# Patient Record
Sex: MALE | Race: OTHER | HISPANIC OR LATINO
[De-identification: names, ages, dates, MRNs, and addresses within clinical notes are randomized per-mention and may not be internally consistent; named-entity substitution may affect disease eponyms.]

---
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
## 2022-08-01 ENCOUNTER — EMERGENCY (EMERGENCY)
Facility: HOSPITAL | Age: 22
LOS: 1 days | Discharge: HOME | End: 2022-08-01
Attending: STUDENT IN AN ORGANIZED HEALTH CARE EDUCATION/TRAINING PROGRAM
Payer: MEDICAID

## 2022-08-01 VITALS
OXYGEN SATURATION: 98 % | SYSTOLIC BLOOD PRESSURE: 111 MMHG | RESPIRATION RATE: 20 BRPM | WEIGHT: 179.9 LBS | DIASTOLIC BLOOD PRESSURE: 63 MMHG | TEMPERATURE: 98 F | HEIGHT: 68 IN | HEART RATE: 80 BPM

## 2022-08-01 PROCEDURE — 99283 EMERGENCY DEPT VISIT LOW MDM: CPT

## 2022-08-01 RX ORDER — METHOCARBAMOL 500 MG/1
1 TABLET, FILM COATED ORAL
Qty: 9 | Refills: 0
Start: 2022-08-01 | End: 2022-08-03

## 2022-08-01 RX ORDER — LIDOCAINE 4 G/100G
1 CREAM TOPICAL ONCE
Refills: 0 | Status: COMPLETED | OUTPATIENT
Start: 2022-08-01 | End: 2022-08-01

## 2022-08-01 RX ORDER — IBUPROFEN 200 MG
600 TABLET ORAL ONCE
Refills: 0 | Status: COMPLETED | OUTPATIENT
Start: 2022-08-01 | End: 2022-08-01

## 2022-08-01 RX ORDER — ACETAMINOPHEN 500 MG
650 TABLET ORAL ONCE
Refills: 0 | Status: COMPLETED | OUTPATIENT
Start: 2022-08-01 | End: 2022-08-01

## 2022-08-01 RX ADMIN — Medication 600 MILLIGRAM(S): at 18:13

## 2022-08-01 RX ADMIN — Medication 650 MILLIGRAM(S): at 18:13

## 2022-08-01 RX ADMIN — LIDOCAINE 1 PATCH: 4 CREAM TOPICAL at 18:13

## 2022-08-01 NOTE — ED PROVIDER NOTE - CLINICAL SUMMARY MEDICAL DECISION MAKING FREE TEXT BOX
22 y/o M with no PMHx complains of right upper back pain x 1 day. PE remarkable for right trapezius tenderness and right thoracic paraspinal tenderness to palpation. No bony midline tenderness, neurologically intact.   Pain control with tylenol, ibuprofen, lidocaine patch. Will reassess.

## 2022-08-01 NOTE — ED PROVIDER NOTE - MUSCULOSKELETAL [+], MLM
BACK PAIN Coordinated care with our medical team and with Jayla Maddox. Pt wants inpatient psychiatric admission and still reporting suicidality. Discussed pt's goals for inpatient treatment, and his expectations. Discussed his substance use and he said he was not using at the moment., Coordinated care with our medical team and with Jayla Maddox. Pt wants inpatient psychiatric admission and still reporting suicidality. Discussed pt's goals for inpatient treatment, and his expectations. Discussed his substance use and he said he was not using at the moment.,
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
## 2022-08-01 NOTE — ED PROVIDER NOTE - NSFOLLOWUPCLINICS_GEN_ALL_ED_FT
[facility]  Ortho/Spine  [address] [place] [address]  Phone: [phone]  Fax:   Follow Up Time: 4-6 Days    [address] [address]  Phone: [phone]  Fax:   Follow Up Time: 1-3 Days
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
## 2022-08-01 NOTE — ED PROVIDER NOTE - NS ED ATTENDING STATEMENT MOD
I have seen and examined this patient and fully participated in the care of this patient as the teaching attending.  The service was shared with the MELLISA.  I reviewed and verified the documentation and independently performed the documented:
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
## 2022-08-01 NOTE — ED PROVIDER NOTE - PHYSICAL EXAMINATION
Vital signs noted, see flowsheet.  General: NAD, well appearing and non-toxic.  HEENT: NC/AT. MMM. Conjunctiva and sclera clear b/l.  EOMI. PERRL.  Neck: Soft and supple, full ROM without pain. No C-spine ttp   Cardiac: RRR. +S1/S2. Peripheral pulses 2+ and symmetric b/l.   Respiratory: Speaking in full sentences, no evidence of respiratory distress. Lungs CTA b/l, no wheezes/rhonchi/rales/stridor.   Abdomen: BSx4. Soft, NTND. No guarding or rebound tenderness. No suprapubic tenderness.  Back: R trapezius ttp, R paraspinal thoracic ttp, No CVAT.  Skin: Normal color for race, no evidence of rash, ecchymosis, cyanosis or jaundice.   Neuro: Awake, alert and oriented to person/place/time/situation. Moves all extremities spontaneously and symmetrically.  No focal deficit,  strong and equal, sensation intact   Psych: Normal affect

## 2022-08-01 NOTE — ED PROVIDER NOTE - NSFOLLOWUPINSTRUCTIONS_ED_ALL_ED_FT
- Deepika un seguimiento con valdez médico de atención primaria en 1 o 2 días. Si no puede hacer un seguimiento con valdez médico de atención primaria, regrese al servicio de urgencias por cualquier problema urgente.  - Busque atención médica inmediata ante cualquier signo o síntoma nuevo, que empeore o que le preocupe.  - [place] los medicamentos según las indicaciones, asegúrese de leer todas las instrucciones en el empaque  - Si tiene dificultades para realizar un seguimiento, llame al: [phone] ([id number]) o visite [email or web address] para obtener un médico o especialista de [facility] que acepte valdez seguro en valdez área.    ¡Sentirse mejor!       Dolor musculoesquelético    Musculoskeletal Pain      "Dolor musculoesquelético" hace referencia a los santo y las molestias en los huesos, las articulaciones, los músculos y los tejidos que los rodean. Tammie dolor puede ocurrir en cualquier parte del cuerpo. Puede durar un breve período (andre) o prolongarse mucho tiempo (crónico).    Es posible que se realicen un examen físico, análisis de laboratorio y estudios de diagnóstico por imágenes para encontrar la causa del dolor musculoesquelético.      Siga estas instrucciones en valdez casa:    Estilo de juan   •Trate de controlar o reducir los niveles de estrés. El estrés aumenta la tensión muscular y puede empeorar el dolor musculoesquelético. Es importante reconocer cuando está ansioso o estresado y aprender distintas formas de controlar tammie estado. D'Lo puede incluir:  •Yoga o meditación.      •Terapia cognitiva o conductual.      •Acupuntura o terapia de masajes.        •Podrá seguir con todas las actividades, a menos que estas le generen más dolor. Cuando el dolor disminuya, retome las actividades habituales de a poco. Aumente gradualmente la intensidad y la duración de las actividades o del ejercicio que realice.        Control del dolor, la rigidez y la hinchazón                   •El tratamiento puede incluir medicamentos para el dolor y la inflamación que se grisel por boca o que se aplican sobre la piel. Use los medicamentos de venta vikram y los recetados solamente ramon se lo haya indicado el médico.      •Si el dolor es intenso, el reposo en cama puede ser beneficioso. Acuéstese o siéntese en cualquier posición que sea cómoda, jacqueline salga de la cama y camine al menos cada dos horas.    •Si se lo indican, aplique calor en la kiet afectada con la frecuencia que le haya indicado el médico. Use la sandeep de calor que el médico le recomiende, ramno candelaria compresa de calor húmedo o candelaria almohadilla térmica.  •Coloque candelaria toalla entre la piel y la sandeep de calor.      •Aplique calor kel 20 a 30 minutos.      •Retire la sandeep de calor si la piel se pone de color moore brillante. D'Lo es especialmente importante si no puede sentir dolor, calor o frío. Puede correr un riesgo mayor de sufrir quemaduras.      •Si se lo indican, aplique hielo sobre la kiet dolorida. Para hacer esto:  •Ponga el hielo en candelaria bolsa plástica.      •Coloque candelaria toalla entre la piel y la bolsa.      •Aplique el hielo kel 20 minutos, 2 o 3 veces por día.      •Retire el hielo si la piel se pone de color moore brillante. D'Lo es muy importante. Si no puede sentir dolor, calor o frío, tiene un mayor riesgo de que se dañe la kiet.        Indicaciones generales     •El médico puede recomendarle que consulte a un fisioterapeuta. Esta persona puede ayudarlo a elaborar un programa de ejercicios seguro.      •Si se lo indica el médico, deepika ejercicios de fisioterapia para mejorar el movimiento y la fuerza de la kiet afectada.      •Cumpla con todas las visitas de seguimiento. D'Lo es importante. D'Lo incluye las visitas al fisioterapeuta.        Comuníquese con un médico si:    •El dolor empeora.      •Los medicamentos no alivian el dolor.      •No puede usar la parte del cuerpo que le duele, ramon un brazo, candelaria pierna o el north.      •Tiene dificultad para dormir.      •Tiene dificultad para realizar las actividades cotidianas.        Solicite ayuda de inmediato si:    •Tiene candelaria nueva lesión o el dolor empeora o es diferente.      •Tiene adormecimiento u hormigueo en la kiet dolorida.        Resumen    •"Dolor musculoesquelético" hace referencia a los santo y las molestias en los huesos, las articulaciones, los músculos y los tejidos que los rodean.      •Tammie dolor puede ocurrir en cualquier parte del cuerpo.      •El médico puede recomendarle que consulte a un fisioterapeuta. Esta persona puede ayudarlo a elaborar un programa de ejercicios seguro. Deepika ejercicios ramon se lo haya indicado el fisioterapeuta.      •Disminuya los niveles de estrés. El estrés puede empeorar el dolor musculoesquelético. Entre los métodos para disminuir el estrés se pueden mencionar la meditación, el yoga, la terapia cognitiva o conductual, la acupuntura y la terapia de masajes.      Esta información no tiene ramon fin reemplazar el consejo del médico. Asegúrese de hacerle al médico cualquier pregunta que tenga.
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
## 2023-09-22 NOTE — ED PROVIDER NOTE - CROS ED EYES ALL NEG
Left voicemail message.  Pt informed 10/5/23 MFM Consult is cancelled and need to be scheduled on a different day.  Pt to call [facility] and ask for me so that we can schedule a new appointment.  Ultrasound and Genetic Counseling are scheduled on 10/5/23.     negative - No discharge, No redness